# Patient Record
Sex: FEMALE | Race: WHITE | Employment: UNEMPLOYED | URBAN - METROPOLITAN AREA
[De-identification: names, ages, dates, MRNs, and addresses within clinical notes are randomized per-mention and may not be internally consistent; named-entity substitution may affect disease eponyms.]

---

## 2019-07-16 LAB — HIV, EXTERNAL: NORMAL

## 2019-12-16 LAB
CHLAMYDIA, EXTERNAL: NORMAL
HBSAG, EXTERNAL: NORMAL
N. GONORRHEA, EXTERNAL: NORMAL
RUBELLA, EXTERNAL: NORMAL
TYPE, ABO & RH, EXTERNAL: NORMAL
TYPE, ABO & RH, EXTERNAL: NORMAL

## 2020-04-30 LAB
ANTIBODY SCREEN, EXTERNAL: NORMAL
T. PALLIDUM, EXTERNAL: NON REACTIVE

## 2020-07-01 LAB — GRBS, EXTERNAL: NORMAL

## 2020-07-14 ENCOUNTER — HOSPITAL ENCOUNTER (OUTPATIENT)
Dept: PREADMISSION TESTING | Age: 35
Discharge: HOME | End: 2020-07-14
Payer: MEDICAID

## 2020-07-14 DIAGNOSIS — U07.1 COVID-19: ICD-10-CM

## 2020-07-14 PROCEDURE — 87635 SARS-COV-2 COVID-19 AMP PRB: CPT

## 2020-07-15 LAB — SARS-COV-2, COV2NT: NOT DETECTED

## 2020-07-24 ENCOUNTER — ANESTHESIA EVENT (OUTPATIENT)
Dept: LABOR AND DELIVERY | Age: 35
DRG: 560 | End: 2020-07-24
Payer: MEDICAID

## 2020-07-24 ENCOUNTER — HOSPITAL ENCOUNTER (INPATIENT)
Age: 35
LOS: 2 days | Discharge: HOME | DRG: 560 | End: 2020-07-26
Attending: OBSTETRICS & GYNECOLOGY | Admitting: OBSTETRICS & GYNECOLOGY
Payer: MEDICAID

## 2020-07-24 ENCOUNTER — ANESTHESIA (OUTPATIENT)
Dept: LABOR AND DELIVERY | Age: 35
DRG: 560 | End: 2020-07-24
Payer: MEDICAID

## 2020-07-24 DIAGNOSIS — Z36.89 NST (NON-STRESS TEST) REACTIVE: ICD-10-CM

## 2020-07-24 DIAGNOSIS — Z3A.40 40 WEEKS GESTATION OF PREGNANCY: Primary | ICD-10-CM

## 2020-07-24 DIAGNOSIS — O47.9 UTERINE CONTRACTIONS DURING PREGNANCY: ICD-10-CM

## 2020-07-24 PROBLEM — O42.90 AMNIOTIC FLUID LEAKING: Status: ACTIVE | Noted: 2020-07-24

## 2020-07-24 LAB
BASOPHILS # BLD: 0 K/UL (ref 0–0.1)
BASOPHILS NFR BLD: 0 % (ref 0–1)
DIFFERENTIAL METHOD BLD: ABNORMAL
EOSINOPHIL # BLD: 0.1 K/UL (ref 0–0.4)
EOSINOPHIL NFR BLD: 1 % (ref 0–7)
ERYTHROCYTE [DISTWIDTH] IN BLOOD BY AUTOMATED COUNT: 15.2 % (ref 11.5–14.5)
HCT VFR BLD AUTO: 38.9 % (ref 35–47)
HGB BLD-MCNC: 12.9 G/DL (ref 11.5–16)
IMM GRANULOCYTES # BLD AUTO: 0.1 K/UL (ref 0–0.04)
IMM GRANULOCYTES NFR BLD AUTO: 1 % (ref 0–0.5)
LYMPHOCYTES # BLD: 1.4 K/UL (ref 0.8–3.5)
LYMPHOCYTES NFR BLD: 14 % (ref 12–49)
MCH RBC QN AUTO: 30.2 PG (ref 26–34)
MCHC RBC AUTO-ENTMCNC: 33.2 G/DL (ref 30–36.5)
MCV RBC AUTO: 91.1 FL (ref 80–99)
MONOCYTES # BLD: 0.7 K/UL (ref 0–1)
MONOCYTES NFR BLD: 7 % (ref 5–13)
NEUTS SEG # BLD: 7.5 K/UL (ref 1.8–8)
NEUTS SEG NFR BLD: 77 % (ref 32–75)
NRBC # BLD: 0 K/UL (ref 0–0.01)
NRBC BLD-RTO: 0 PER 100 WBC
PLATELET # BLD AUTO: 303 K/UL (ref 150–400)
PMV BLD AUTO: 9.7 FL (ref 8.9–12.9)
RBC # BLD AUTO: 4.27 M/UL (ref 3.8–5.2)
WBC # BLD AUTO: 9.8 K/UL (ref 3.6–11)

## 2020-07-24 PROCEDURE — 74011250636 HC RX REV CODE- 250/636: Performed by: ADVANCED PRACTICE MIDWIFE

## 2020-07-24 PROCEDURE — 77030014125 HC TY EPDRL BBMI -B: Performed by: ANESTHESIOLOGY

## 2020-07-24 PROCEDURE — 74011000250 HC RX REV CODE- 250: Performed by: ANESTHESIOLOGY

## 2020-07-24 PROCEDURE — 36415 COLL VENOUS BLD VENIPUNCTURE: CPT

## 2020-07-24 PROCEDURE — 74011000250 HC RX REV CODE- 250

## 2020-07-24 PROCEDURE — 00HU33Z INSERTION OF INFUSION DEVICE INTO SPINAL CANAL, PERCUTANEOUS APPROACH: ICD-10-PCS | Performed by: ANESTHESIOLOGY

## 2020-07-24 PROCEDURE — A4300 CATH IMPL VASC ACCESS PORTAL: HCPCS

## 2020-07-24 PROCEDURE — 75410000000 HC DELIVERY VAGINAL/SINGLE

## 2020-07-24 PROCEDURE — 77030002933 HC SUT MCRYL J&J -A

## 2020-07-24 PROCEDURE — 0UQMXZZ REPAIR VULVA, EXTERNAL APPROACH: ICD-10-PCS | Performed by: OBSTETRICS & GYNECOLOGY

## 2020-07-24 PROCEDURE — 74011250636 HC RX REV CODE- 250/636: Performed by: OBSTETRICS & GYNECOLOGY

## 2020-07-24 PROCEDURE — 75410000003 HC RECOV DEL/VAG/CSECN EA 0.5 HR

## 2020-07-24 PROCEDURE — 75410000002 HC LABOR FEE PER 1 HR

## 2020-07-24 PROCEDURE — 77030019905 HC CATH URETH INTMIT MDII -A

## 2020-07-24 PROCEDURE — 99284 EMERGENCY DEPT VISIT MOD MDM: CPT

## 2020-07-24 PROCEDURE — 99282 EMERGENCY DEPT VISIT SF MDM: CPT

## 2020-07-24 PROCEDURE — 74011250636 HC RX REV CODE- 250/636

## 2020-07-24 PROCEDURE — 65410000002 HC RM PRIVATE OB

## 2020-07-24 PROCEDURE — 74011250637 HC RX REV CODE- 250/637: Performed by: OBSTETRICS & GYNECOLOGY

## 2020-07-24 PROCEDURE — 76060000078 HC EPIDURAL ANESTHESIA

## 2020-07-24 PROCEDURE — 74011250636 HC RX REV CODE- 250/636: Performed by: ANESTHESIOLOGY

## 2020-07-24 PROCEDURE — 85025 COMPLETE CBC W/AUTO DIFF WBC: CPT

## 2020-07-24 RX ORDER — SIMETHICONE 80 MG
80 TABLET,CHEWABLE ORAL
Status: DISCONTINUED | OUTPATIENT
Start: 2020-07-24 | End: 2020-07-26 | Stop reason: HOSPADM

## 2020-07-24 RX ORDER — NALOXONE HYDROCHLORIDE 0.4 MG/ML
0.4 INJECTION, SOLUTION INTRAMUSCULAR; INTRAVENOUS; SUBCUTANEOUS AS NEEDED
Status: DISCONTINUED | OUTPATIENT
Start: 2020-07-24 | End: 2020-07-24 | Stop reason: HOSPADM

## 2020-07-24 RX ORDER — BUPIVACAINE HYDROCHLORIDE 5 MG/ML
30 INJECTION, SOLUTION EPIDURAL; INTRACAUDAL AS NEEDED
Status: DISCONTINUED | OUTPATIENT
Start: 2020-07-24 | End: 2020-07-24 | Stop reason: HOSPADM

## 2020-07-24 RX ORDER — OXYTOCIN/0.9 % SODIUM CHLORIDE 30/500 ML
500 PLASTIC BAG, INJECTION (ML) INTRAVENOUS ONCE
Status: ACTIVE | OUTPATIENT
Start: 2020-07-24 | End: 2020-07-24

## 2020-07-24 RX ORDER — MAG HYDROX/ALUMINUM HYD/SIMETH 200-200-20
30 SUSPENSION, ORAL (FINAL DOSE FORM) ORAL
Status: DISCONTINUED | OUTPATIENT
Start: 2020-07-24 | End: 2020-07-24 | Stop reason: HOSPADM

## 2020-07-24 RX ORDER — LIDOCAINE HYDROCHLORIDE AND EPINEPHRINE 15; 5 MG/ML; UG/ML
INJECTION, SOLUTION EPIDURAL AS NEEDED
Status: DISCONTINUED | OUTPATIENT
Start: 2020-07-24 | End: 2020-07-24 | Stop reason: HOSPADM

## 2020-07-24 RX ORDER — BUPIVACAINE HYDROCHLORIDE 5 MG/ML
INJECTION, SOLUTION EPIDURAL; INTRACAUDAL
Status: DISCONTINUED
Start: 2020-07-24 | End: 2020-07-24

## 2020-07-24 RX ORDER — SODIUM CHLORIDE 0.9 % (FLUSH) 0.9 %
5-40 SYRINGE (ML) INJECTION AS NEEDED
Status: DISCONTINUED | OUTPATIENT
Start: 2020-07-24 | End: 2020-07-26 | Stop reason: HOSPADM

## 2020-07-24 RX ORDER — OXYTOCIN/0.9 % SODIUM CHLORIDE 30/500 ML
PLASTIC BAG, INJECTION (ML) INTRAVENOUS
Status: DISCONTINUED
Start: 2020-07-24 | End: 2020-07-24

## 2020-07-24 RX ORDER — OXYTOCIN/RINGER'S LACTATE 20/1000 ML
125 PLASTIC BAG, INJECTION (ML) INTRAVENOUS AS NEEDED
Status: DISCONTINUED | OUTPATIENT
Start: 2020-07-24 | End: 2020-07-26 | Stop reason: HOSPADM

## 2020-07-24 RX ORDER — SODIUM CHLORIDE 0.9 % (FLUSH) 0.9 %
5-40 SYRINGE (ML) INJECTION EVERY 8 HOURS
Status: DISCONTINUED | OUTPATIENT
Start: 2020-07-24 | End: 2020-07-25

## 2020-07-24 RX ORDER — OXYCODONE AND ACETAMINOPHEN 5; 325 MG/1; MG/1
2 TABLET ORAL
Status: DISCONTINUED | OUTPATIENT
Start: 2020-07-24 | End: 2020-07-26 | Stop reason: HOSPADM

## 2020-07-24 RX ORDER — HYDROCORTISONE 1 %
CREAM (GRAM) TOPICAL AS NEEDED
Status: DISCONTINUED | OUTPATIENT
Start: 2020-07-24 | End: 2020-07-26 | Stop reason: HOSPADM

## 2020-07-24 RX ORDER — AMMONIA 15 % (W/V)
1 AMPUL (EA) INHALATION AS NEEDED
Status: DISCONTINUED | OUTPATIENT
Start: 2020-07-24 | End: 2020-07-26 | Stop reason: HOSPADM

## 2020-07-24 RX ORDER — BUPIVACAINE HYDROCHLORIDE 2.5 MG/ML
INJECTION, SOLUTION EPIDURAL; INFILTRATION; INTRACAUDAL AS NEEDED
Status: DISCONTINUED | OUTPATIENT
Start: 2020-07-24 | End: 2020-07-24 | Stop reason: HOSPADM

## 2020-07-24 RX ORDER — DOCUSATE SODIUM 100 MG/1
100 CAPSULE, LIQUID FILLED ORAL
Status: DISCONTINUED | OUTPATIENT
Start: 2020-07-24 | End: 2020-07-26 | Stop reason: HOSPADM

## 2020-07-24 RX ORDER — ZOLPIDEM TARTRATE 5 MG/1
TABLET ORAL
COMMUNITY
End: 2020-07-26

## 2020-07-24 RX ORDER — SODIUM CHLORIDE, SODIUM LACTATE, POTASSIUM CHLORIDE, CALCIUM CHLORIDE 600; 310; 30; 20 MG/100ML; MG/100ML; MG/100ML; MG/100ML
125 INJECTION, SOLUTION INTRAVENOUS CONTINUOUS
Status: DISCONTINUED | OUTPATIENT
Start: 2020-07-24 | End: 2020-07-25

## 2020-07-24 RX ORDER — LIDOCAINE HYDROCHLORIDE AND EPINEPHRINE 15; 5 MG/ML; UG/ML
4.5 INJECTION, SOLUTION EPIDURAL AS NEEDED
Status: DISCONTINUED | OUTPATIENT
Start: 2020-07-24 | End: 2020-07-24 | Stop reason: HOSPADM

## 2020-07-24 RX ORDER — IBUPROFEN 600 MG/1
600 TABLET ORAL
Status: DISCONTINUED | OUTPATIENT
Start: 2020-07-24 | End: 2020-07-26 | Stop reason: HOSPADM

## 2020-07-24 RX ORDER — ONDANSETRON 2 MG/ML
4 INJECTION INTRAMUSCULAR; INTRAVENOUS
Status: DISCONTINUED | OUTPATIENT
Start: 2020-07-24 | End: 2020-07-24

## 2020-07-24 RX ORDER — FENTANYL/BUPIVACAINE/NS/PF 2-1250MCG
PREFILLED PUMP RESERVOIR EPIDURAL
Status: COMPLETED
Start: 2020-07-24 | End: 2020-07-24

## 2020-07-24 RX ORDER — DIPHENHYDRAMINE HCL 25 MG
25 CAPSULE ORAL
Status: DISCONTINUED | OUTPATIENT
Start: 2020-07-24 | End: 2020-07-26 | Stop reason: HOSPADM

## 2020-07-24 RX ORDER — FENTANYL CITRATE 50 UG/ML
INJECTION, SOLUTION INTRAMUSCULAR; INTRAVENOUS
Status: COMPLETED
Start: 2020-07-24 | End: 2020-07-24

## 2020-07-24 RX ORDER — BUPROPION HYDROCHLORIDE 300 MG/1
300 TABLET ORAL
Status: DISCONTINUED | OUTPATIENT
Start: 2020-07-25 | End: 2020-07-24 | Stop reason: CLARIF

## 2020-07-24 RX ORDER — OXYTOCIN/RINGER'S LACTATE 20/1000 ML
999 PLASTIC BAG, INJECTION (ML) INTRAVENOUS AS NEEDED
Status: DISCONTINUED | OUTPATIENT
Start: 2020-07-24 | End: 2020-07-26 | Stop reason: HOSPADM

## 2020-07-24 RX ORDER — EPHEDRINE SULFATE/0.9% NACL/PF 50 MG/5 ML
SYRINGE (ML) INTRAVENOUS
Status: DISCONTINUED
Start: 2020-07-24 | End: 2020-07-24 | Stop reason: WASHOUT

## 2020-07-24 RX ORDER — OXYCODONE AND ACETAMINOPHEN 5; 325 MG/1; MG/1
1 TABLET ORAL
Status: DISCONTINUED | OUTPATIENT
Start: 2020-07-24 | End: 2020-07-26 | Stop reason: HOSPADM

## 2020-07-24 RX ORDER — OXYTOCIN/0.9 % SODIUM CHLORIDE 30/500 ML
0-25 PLASTIC BAG, INJECTION (ML) INTRAVENOUS
Status: DISCONTINUED | OUTPATIENT
Start: 2020-07-24 | End: 2020-07-24

## 2020-07-24 RX ORDER — FENTANYL CITRATE 50 UG/ML
100 INJECTION, SOLUTION INTRAMUSCULAR; INTRAVENOUS ONCE
Status: COMPLETED | OUTPATIENT
Start: 2020-07-24 | End: 2020-07-24

## 2020-07-24 RX ORDER — BUPROPION HYDROCHLORIDE 300 MG/1
300 TABLET ORAL
COMMUNITY

## 2020-07-24 RX ORDER — LIDOCAINE HYDROCHLORIDE 10 MG/ML
10 INJECTION INFILTRATION; PERINEURAL AS NEEDED
Status: DISCONTINUED | OUTPATIENT
Start: 2020-07-24 | End: 2020-07-24 | Stop reason: HOSPADM

## 2020-07-24 RX ORDER — BUTORPHANOL TARTRATE 1 MG/ML
1 INJECTION INTRAMUSCULAR; INTRAVENOUS
Status: DISCONTINUED | OUTPATIENT
Start: 2020-07-24 | End: 2020-07-26 | Stop reason: HOSPADM

## 2020-07-24 RX ORDER — FENTANYL/BUPIVACAINE/NS/PF 2-1250MCG
1-16 PREFILLED PUMP RESERVOIR EPIDURAL CONTINUOUS
Status: DISCONTINUED | OUTPATIENT
Start: 2020-07-24 | End: 2020-07-24

## 2020-07-24 RX ORDER — CALCIUM CARBONATE 200(500)MG
200 TABLET,CHEWABLE ORAL
Status: DISCONTINUED | OUTPATIENT
Start: 2020-07-24 | End: 2020-07-24

## 2020-07-24 RX ORDER — BUPROPION HYDROCHLORIDE 150 MG/1
150 TABLET, EXTENDED RELEASE ORAL 2 TIMES DAILY
Status: DISCONTINUED | OUTPATIENT
Start: 2020-07-25 | End: 2020-07-25

## 2020-07-24 RX ORDER — ONDANSETRON 4 MG/1
4 TABLET, ORALLY DISINTEGRATING ORAL
Status: DISCONTINUED | OUTPATIENT
Start: 2020-07-24 | End: 2020-07-26 | Stop reason: HOSPADM

## 2020-07-24 RX ADMIN — IBUPROFEN 600 MG: 600 TABLET, FILM COATED ORAL at 17:27

## 2020-07-24 RX ADMIN — LIDOCAINE HYDROCHLORIDE,EPINEPHRINE BITARTRATE 3 ML: 15; .005 INJECTION, SOLUTION EPIDURAL; INFILTRATION; INTRACAUDAL; PERINEURAL at 05:50

## 2020-07-24 RX ADMIN — Medication 10 ML: at 03:04

## 2020-07-24 RX ADMIN — OXYTOCIN 2 MILLI-UNITS/MIN: 10 INJECTION, SOLUTION INTRAMUSCULAR; INTRAVENOUS at 11:23

## 2020-07-24 RX ADMIN — BUPIVACAINE HYDROCHLORIDE 10 ML: 2.5 INJECTION, SOLUTION EPIDURAL; INFILTRATION; INTRACAUDAL; PERINEURAL at 05:53

## 2020-07-24 RX ADMIN — Medication 10 ML/HR: at 06:03

## 2020-07-24 RX ADMIN — SODIUM CHLORIDE, SODIUM LACTATE, POTASSIUM CHLORIDE, AND CALCIUM CHLORIDE 125 ML/HR: 600; 310; 30; 20 INJECTION, SOLUTION INTRAVENOUS at 13:05

## 2020-07-24 RX ADMIN — SODIUM CHLORIDE, SODIUM LACTATE, POTASSIUM CHLORIDE, AND CALCIUM CHLORIDE 999 ML/HR: 600; 310; 30; 20 INJECTION, SOLUTION INTRAVENOUS at 03:34

## 2020-07-24 RX ADMIN — SODIUM CHLORIDE, SODIUM LACTATE, POTASSIUM CHLORIDE, AND CALCIUM CHLORIDE 125 ML/HR: 600; 310; 30; 20 INJECTION, SOLUTION INTRAVENOUS at 07:37

## 2020-07-24 RX ADMIN — ONDANSETRON 4 MG: 2 INJECTION INTRAMUSCULAR; INTRAVENOUS at 11:31

## 2020-07-24 RX ADMIN — ONDANSETRON 4 MG: 2 INJECTION INTRAMUSCULAR; INTRAVENOUS at 14:32

## 2020-07-24 RX ADMIN — SODIUM CHLORIDE, SODIUM LACTATE, POTASSIUM CHLORIDE, AND CALCIUM CHLORIDE 999 ML/HR: 600; 310; 30; 20 INJECTION, SOLUTION INTRAVENOUS at 12:16

## 2020-07-24 RX ADMIN — Medication 10 ML/HR: at 13:12

## 2020-07-24 RX ADMIN — BUTORPHANOL TARTRATE 1 MG: 1 INJECTION, SOLUTION INTRAMUSCULAR; INTRAVENOUS at 02:59

## 2020-07-24 RX ADMIN — SODIUM CHLORIDE, SODIUM LACTATE, POTASSIUM CHLORIDE, AND CALCIUM CHLORIDE 999 ML/HR: 600; 310; 30; 20 INJECTION, SOLUTION INTRAVENOUS at 05:36

## 2020-07-24 RX ADMIN — FENTANYL CITRATE 100 MCG: 50 INJECTION, SOLUTION INTRAMUSCULAR; INTRAVENOUS at 05:53

## 2020-07-24 RX ADMIN — PROMETHAZINE HYDROCHLORIDE 12.5 MG: 25 INJECTION INTRAMUSCULAR; INTRAVENOUS at 03:16

## 2020-07-24 RX ADMIN — IBUPROFEN 600 MG: 600 TABLET, FILM COATED ORAL at 23:17

## 2020-07-24 NOTE — PROGRESS NOTES
0025~  Patient arrived from home after onset of contractions late last evening. Was seen in the office on 7/23 and was 1/50, membranes were swept by Dr Rachel Lopez.  Anne-Marie Kruse  Call placed to [address]  0035~  SVE  0036~  [address] in room to see patient  0100~  Patient up to ambulate in halls  0135~  Patient returned from walk, felt a gush of fluid. 0140~  Nitrazine positive. Admit to labor and delivery. 0150~  TRANSFER - OUT REPORT:    Verbal report given to KLEBER Hampton RN(name) on [employer]  being transferred to L&D(unit) for routine progression of care       Report consisted of patients Situation, Background, Assessment and   Recommendations(SBAR). Information from the following report(s) SBAR and Recent Results was reviewed with the receiving nurse. Lines:   Peripheral IV 07/24/20 Left;Posterior Forearm (Active)        Opportunity for questions and clarification was provided.       Patient transported with:   Registered Nurse

## 2020-07-24 NOTE — ANESTHESIA PREPROCEDURE EVALUATION
Relevant Problems   No relevant active problems       Anesthetic History   No history of anesthetic complications            Review of Systems / Medical History  Patient summary reviewed, nursing notes reviewed and pertinent labs reviewed    Pulmonary  Within defined limits                 Neuro/Psych   Within defined limits           Cardiovascular  Within defined limits                Exercise tolerance: >4 METS     GI/Hepatic/Renal  Within defined limits              Endo/Other  Within defined limits           Other Findings              Physical Exam    Airway  Mallampati: II  TM Distance: > 6 cm  Neck ROM: normal range of motion   Mouth opening: Normal     Cardiovascular  Regular rate and rhythm,  S1 and S2 normal,  no murmur, click, rub, or gallop             Dental  No notable dental hx       Pulmonary  Breath sounds clear to auscultation               Abdominal  GI exam deferred       Other Findings            Anesthetic Plan    ASA: 2  Anesthesia type: epidural          Induction: Intravenous  Anesthetic plan and risks discussed with: Patient and Spouse

## 2020-07-24 NOTE — L&D DELIVERY NOTE
Delivery Summary    Patient: Nir Butler MRN: [id number]  SSN: [id number]    YOB: 1985  Age: 37 y.o. Sex: female       Information for the patient's :  Kale Alvarez [[id number]]     Patient pushed for just over two hours with spontaneous delivery of head, followed by delivery of fetal body.  had great tone, was placed directly on maternal abdomen, mouth and nose were suctioned, then had a strong cry. Delayed cord clamping was performed, then cut by the father of the baby. The placenta delivered intact. No perineal lacerations were noted. A superficial left labial laceration was identified and repaired using 4-0 monocryl in a running fashion. The fundus was firm and bleeding was appropriate. Labor Events:    Labor: No    Steroids: None   Cervical Ripening Date/Time:       Cervical Ripening Type: None   Antibiotics During Labor: No   Rupture Identifier:      Rupture Date/Time: 2020 1:40 AM   Rupture Type: SROM   Amniotic Fluid Volume: Moderate    Amniotic Fluid Description:      Amniotic Fluid Odor:      Induction: None       Induction Date/Time:        Indications for Induction:      Augmentation:     Augmentation Date/Time:      Indications for Augmentation:     Labor complications: Additional complications:        Delivery Events:  Indications For Episiotomy:     Episiotomy: None   Perineal Laceration(s): None   Repaired:     Periurethral Laceration Location:      Repaired:     Labial Laceration Location: left   Repaired:     Sulcal Laceration Location:     Repaired:     Vaginal Laceration Location:     Repaired:     Cervical Laceration Location:     Repaired:     Repair Suture:  Other (See Note)   Number of Repair Packets: 1   Estimated Blood Loss (ml):  ml   Quantitative Blood Loss (ml)                Delivery Date: 2020    Delivery Time: 3:09 PM  Delivery Type: Vaginal, Spontaneous  Sex:  Female    Gestational Age: 44w3d   Delivery Clinician:  Pattie Ramirez  Living Status: Living   Delivery Location: L&D LD room 6          APGARS  One minute Five minutes Ten minutes   Skin color: 1   1        Heart rate: 2   2        Grimace: 2   2        Muscle tone: 2   2        Breathin   2        Totals: 9   9            Presentation: Vertex    Position:   Occiput Anterior  Resuscitation Method:  Suctioning-bulb; Tactile Stimulation     Meconium Stained: Thick      Cord Information: 3 Vessels  Complications: None  Cord around:    Delayed cord clamping? Yes  Cord clamped date/time:2020  3:11 PM  Disposition of Cord Blood: Discard    Blood Gases Sent?: No    Placenta:  Date/Time: 2020  3:14 PM  Removal: Spontaneous      Appearance: Normal      Measurements:  Birth Weight:        Birth Length:        Head Circumference:        Chest Circumference:       Abdominal Girth: Other Providers:   Oliva Ortiz, Obstetrician;Primary Nurse;Primary  Nurse           Group B Strep:   Lab Results   Component Value Date/Time    GrBStrep, External neg 2020     Information for the patient's :  Nick Sorto [[id number]]   No results found for: ABORH, PCTABR, PCTDIG, BILI, ABORHEXT, ABORH     No results for input(s): PCO2CB, PO2CB, HCO3I, SO2I, IBD, PTEMPI, SPECTI, PHICB, ISITE, IDEV, IALLEN in the last 72 hours.

## 2020-07-24 NOTE — PROGRESS NOTES
Labor Progress Note    S: Comfortable now with epidural.    Physical Exam:  Patient Vitals for the past 4 hrs:   Temp Pulse Resp BP SpO2   20 0700     99 %   20 0659  96  109/60    20 0638  (!) 105  94/53 99 %   20 0627  (!) 106  97/52 99 %   20 0622  (!) 110  93/45 99 %   20 0609  (!) 103  112/56    20 0608     99 %   20 0607     99 %   20 0604  (!) 103  110/49    20 0557  (!) 123  110/61 99 %   20 0554     94 %   20 0553 97.8 °F (36.6 °C) (!) 105 18 123/79    20 0406 98.1 °F (36.7 °C)             Cervical Exam: 7-8/90/-1 per Gonsalo Hope RN  Membranes:  Light mec  Uterine Contractions:  Frequency: Irregular q1.5-5 minutes, strong  Fetal Heart Rate: Reactive, 135s, +accels, early decels, moderate variability      Assessment/Plan:    37 y.o.  at 40w1d IUP  Active labor  Cat 1 tracing  GBS negative    P:  Continue present management    Signout to Dr. Gena Chavez at [address] [place] [address]

## 2020-07-24 NOTE — PROGRESS NOTES
TRANSFER - IN REPORT:    Verbal report received from EDWIN Perez RN(name) on Frankie Chung  being received from L&D(unit) for routine progression of care      Report consisted of patients Situation, Background, Assessment and   Recommendations(SBAR). Information from the following report(s) SBAR was reviewed with the receiving nurse. Opportunity for questions and clarification was provided. Assessment completed upon patients arrival to unit and care assumed. 6691:  Meconium stained fluid and bloody show noted on pad  0542:  Dr Richard Botello at bedside for epidural placement   8227: Bedside and Verbal shift change report given to [address] [place] [address] (oncoming nurse) by Arabella Robert RN (offgoing nurse). Report included the following information SBAR, Kardex, Intake/Output, MAR and Recent Results.

## 2020-07-24 NOTE — ED PROVIDER NOTES
[facility] History and Physical    Patient is a 37 y.o.  at 40w1d with yakov 20 who presents to the [facility] with c/o contractions at 0033. She reports contractions started around 2200 and are now about q10 minutes or less. She was checked in the office today and had her membranes stripped and has had some spotting since. She reports good FM. Denies LOF, n/v/d, fever, cough, sore throat, SOB/difficulty breathing, loss of taste/smell, exposure to anyone with COVID, h/a, changes in vision, RUQ/epigastric pain. She reports prenatal care with Dr. Scooter Rosas c/b [facility], [facility]- had growth scan at 28 weeks and NIPT screening normal. H/o anxiety/depression (recovering alcoholic)- on wellbutrin. PNC: Blood type: A            RH: pos            Hep B: negative            Rubella: immune            GBS status: negative            HIV: non reactive            RPR/TPA/VDRL: non reactive            GC/CT: negative            HSV serology: not noted on prenatal records, but patient denies any hx of HSV    The history is provided by the patient. Contractions    This is a new problem. The current episode started 3 to 5 hours ago. Episode frequency: intermittent q10 minutes. The problem has been gradually worsening. The pain is at a severity of 8/10. Pertinent negatives include no fever, no diarrhea, no nausea, no vomiting, no headaches and no chest pain.         Past Medical History:   Diagnosis Date    Abnormal Papanicolaou smear of cervix     normal f/u    Breast disorder     augmentation    Psychiatric problem     anxiety    Psychiatric problem 2018    recovering alcoholic       Past Surgical History:   Procedure Laterality Date    BREAST SURGERY PROCEDURE UNLISTED      augmentation    HX OTHER SURGICAL      wisdom teeth         Family History:   Problem Relation Age of Onset    Diabetes Maternal Grandmother     Colon Cancer Maternal Grandfather        Social History     Socioeconomic History    Marital status: SINGLE     Spouse name: Not on file    Number of children: Not on file    Years of education: Not on file    Highest education level: Not on file   Occupational History    Not on file   Social Needs    Financial resource strain: Not on file    Food insecurity     Worry: Not on file     Inability: Not on file    Transportation needs     Medical: Not on file     Non-medical: Not on file   Tobacco Use    Smoking status: Never Smoker    Smokeless tobacco: Never Used   Substance and Sexual Activity    Alcohol use: Not Currently    Drug use: Never    Sexual activity: Not on file   Lifestyle    Physical activity     Days per week: Not on file     Minutes per session: Not on file    Stress: Not on file   Relationships    Social connections     Talks on phone: Not on file     Gets together: Not on file     Attends Nondenominational service: Not on file     Active member of club or organization: Not on file     Attends meetings of clubs or organizations: Not on file     Relationship status: Not on file    Intimate partner violence     Fear of current or ex partner: Not on file     Emotionally abused: Not on file     Physically abused: Not on file     Forced sexual activity: Not on file   Other Topics Concern     Service Not Asked    Blood Transfusions Not Asked    Caffeine Concern Not Asked    Occupational Exposure Not Asked   Melina Fent Hazards Not Asked    Sleep Concern Not Asked    Stress Concern Not Asked    Weight Concern Not Asked    Special Diet Not Asked    Back Care Not Asked    Exercise Not Asked    Bike Helmet Not Asked    Seat Belt Not Asked    Self-Exams Not Asked   Social History Narrative    Not on file         ALLERGIES: Patient has no known allergies. Review of Systems   Constitutional: Negative for chills and fever. Eyes: Negative for visual disturbance. Respiratory: Negative for cough and shortness of breath. Cardiovascular: Negative for chest pain and leg swelling. Gastrointestinal: Positive for abdominal pain. Negative for diarrhea, nausea and vomiting. Contractions   Genitourinary: Negative for vaginal bleeding and vaginal discharge. Musculoskeletal: Negative for gait problem. Neurological: Negative for speech difficulty and headaches. All other systems reviewed and are negative. Vitals:    07/24/20 0029 07/24/20 0051 07/24/20 0121   BP: 141/86 128/84    Pulse: 96 92    Resp: 14 16    Temp: 97.8 °F (36.6 °C) 97.7 °F (36.5 °C)    Weight:   78 kg (172 lb)   Height:   5' 5\" (1.651 m)            Physical Exam  Vitals signs and nursing note reviewed. Exam conducted with a chaperone present. Constitutional:       General: She is awake. Appearance: Normal appearance. She is well-developed, well-groomed and normal weight. HENT:      Head: Normocephalic. Nose: Nose normal.   Neck:      Musculoskeletal: Normal range of motion. Cardiovascular:      Rate and Rhythm: Normal rate and regular rhythm. Pulses: Normal pulses. Pulmonary:      Effort: Pulmonary effort is normal.      Comments: Breathing easy and unlabored between contractions  Abdominal:      Tenderness: There is no abdominal tenderness. Comments: Gravid- approximately 40 weeks  FHTs: 135s, +accels, neg decels, moderate variability  [place]: q6-7 minutes, mild to moderate   Genitourinary:     General: Normal vulva. Exam position: Supine. Labia:         Right: No lesion. Left: No lesion. Vagina: Normal.      Cervix: Dilated. Uterus: Normal. Enlarged. Rectum: Normal.      Comments: Gravid uterus  SVE: 2/90/-2, vtx per Elsa Singleton RN  Membranes: intact  Musculoskeletal: Normal range of motion. Right lower leg: No edema. Left lower leg: No edema. Skin:     General: Skin is warm and dry. Neurological:      Mental Status: She is alert and oriented to person, place, and time. Gait: Gait is intact.    Psychiatric:         Mood and Affect: Mood normal.         Speech: Speech normal.         Behavior: Behavior normal. Behavior is cooperative. Thought Content: Thought content normal.         Cognition and Memory: Cognition and memory normal.         Judgment: Judgment normal.     Exam:  General: alert & oriented x3  HEENT:  normal   Lungs:  clear  Cor:  RRR  Abdomen:  Soft, non-tender                    Clinical EFW 7lbs 8oz  Cervix:  2/90/-2, vtx per RN  Fluid:  intact  Pelvimetry:  AP-good                      Arch- adequate                      Sidewalls- adequate                      Pelvis feels adequate for fetus. Extremities:  normal, no edema        MDM  Number of Diagnoses or Management Options  40 weeks gestation of pregnancy: established and improving  NST (non-stress test) reactive: new and requires workup  Uterine contractions during pregnancy: new and requires workup     Amount and/or Complexity of Data Reviewed  Clinical lab tests: reviewed  Tests in the medicine section of CPT®: ordered and reviewed  Review and summarize past medical records: yes  Independent visualization of images, tracings, or specimens: yes    Risk of Complications, Morbidity, and/or Mortality  Presenting problems: moderate  Diagnostic procedures: moderate  Management options: moderate    Critical Care  Total time providing critical care: < 30 minutes    Patient Progress  Patient progress: stable    ED Course as of  0238      0045 Patient arrives to [facility] with c/o contractions that started around 2200 and are about q10 minutes or less. Good FM. Some spotting since having her membranes stripped this am. Denies LOF. SVE 2/90/-2 per RN. Will recheck 1-2 hours.      [SB]      ED Course User Index  [SB] Kate FAY CNM       Procedures  NST, SVE    Impression:  at 40w1d IUP  Latent labor vs prodromal labor vs false labor  Cat 1 tracing  GBS negative      Plan:  Admit to [facility] for NST, labor check  Reviewed SVE with patient/partner and they desire to ambulate and be rechecked in an hour or two  RN to recheck at that time  All questions asked/answered and patient/partner agree with plan    Update: 0220  Patient SROM while walking.  Will admit to L&D

## 2020-07-24 NOTE — PROGRESS NOTES
Labor Progress Note  Patient seen, fetal heart rate and contraction pattern evaluated, patient examined. Patient Vitals for the past 1 hrs:   BP Temp Pulse Resp   20 0801 96/82 99 °F (37.2 °C) 96 16   Subjective:  Patient is without complaint. Comfortable with her epidural and is not feeling pressure. Physical Exam:  Visit Vitals  BP 96/82   Pulse 96   Temp 99 °F (37.2 °C)   Resp 16   Ht 5' 5\" (1.651 m)   Wt 78 kg (172 lb)   SpO2 99%   Breastfeeding No   BMI 28.62 kg/m²     GEN: NAD, resting on her side  Pulm: no resp distress  Abd: soft, gravid  : normal appearance, cervix 9cm/100%/-1    Fetal Heart Rate: Cat 1  Tocometry: contractions every 3-5 minutes    Assessment/Plan:  33yo  @ 40w1d in term labor. 1. Labor: progressing well. Patient has continued to make cervical change, so will hold on pitocin for now - but if her cervix is unchanged or her contractions space more, recommend we consider pitocin augmentation. 2. IUP: reassuring fetal surveillance  3. H/o anxiety/depression and recovering alcoholic, pt on wellbutrin  4. AMA, normal NIPT  APA, normal 28 week growth scan  5. COVID neg  6. GBS neg  7.  Anticipate vaginal delivery

## 2020-07-24 NOTE — PROGRESS NOTES
1138 - Bedside and Verbal shift change report given to H. Leopold Cheek RNC (oncoming nurse) by Lilly Ivory RN (offgoing nurse). Report included the following information SBAR, Kardex, Procedure Summary, Intake/Output, MAR, Accordion, Recent Results and Med Rec Status. Pt is resting on her side and breathing through the pressure/discomfort of UCs. \"Laboring down'/awaiting Pitocin augmentation to get contractions closer before resuming pushing. 1215 - MEWS score of 3 at 1208. Temp stable at 99.0. LR bolus of 500mL given for maternal tachycardia. Will continue to monitor. 1312 - MEWS score remains 3. Pt temp 99.9    1319 - Dr Freya Arzate at bedside to assess pt. Resumed pushing at 1318.    1336 - Dr Freya Arzate out. She is aware of slightly elevated temp. 1509 -  of live female infant. 1519 - MEWS score of 3 due to tachycardia; will continue to assess. 1734 - MEWS score now 1    1615 - TRANSFER - OUT REPORT:    Verbal report given to [facility] RN(name) on [employer]  being transferred to [facility] Rm 330(unit) for routine progression of care       Report consisted of patients Situation, Background, Assessment and   Recommendations(SBAR). Information from the following report(s) SBAR, Kardex, Procedure Summary, Intake/Output, MAR, Accordion, Recent Results and Med Rec Status was reviewed with the receiving nurse. Lines:   Peripheral IV 20 Left;Posterior Forearm (Active)   Site Assessment Clean, dry, & intact 20 0800   Phlebitis Assessment 0 20 0800   Infiltration Assessment 0 20 0800   Dressing Status Clean, dry, & intact 20 08   Dressing Type Transparent;Tape 20 08   Hub Color/Line Status Pink 20 08        Opportunity for questions and clarification was provided. Patient transported with:   Registered Nurse via wheelchair, accompanied by  pushing NB in open crib.

## 2020-07-24 NOTE — PROGRESS NOTES
In to see patient  She's doing well, starting to feel pressure  Cervix 10cm/100%/0  Cat 2  Patient had started pushing, contractions about every 10 minutes  Will start pitocin augmentation and labor down for about 30 minutes in attempt to avoid maternal fatigue, then resume pushing  Will administer 4mg IV zofran for patient's complaints of nausea

## 2020-07-24 NOTE — H&P
Labor and Delivery History and Physical  2020    Patient is a 37 y.o. Tay Guajardo at 40w1d with yakov 20 who is now admitted to L&D for latent labor and SROM. She initially presented to the [facility] with c/o contractions at 0033. She reports contractions started around 2200 and are now about q10 minutes or less. She was checked in the office today and had her membranes stripped and has had some spotting since. She reports good FM. Denies LOF, n/v/d, fever, cough, sore throat, SOB/difficulty breathing, loss of taste/smell, exposure to anyone with COVID, h/a, changes in vision, RUQ/epigastric pain.       She reports prenatal care with Dr. Cheyanne Smith c/b [facility], [facility]- had growth scan at 28 weeks and NIPT screening normal. H/o anxiety/depression (recovering alcoholic)- on wellbutrin.          PNC: Blood type: A            RH: pos            Hep B: negative            Rubella: immune            GBS status: negative            HIV: non reactive            RPR/TPA/VDRL: non reactive            GC/CT: negative            HSV serology: not noted on prenatal records, but patient denies any hx of HSV     The history is provided by the patient. Contractions    This is a new problem. The current episode started 3 to 5 hours ago. Episode frequency: intermittent q10 minutes. The problem has been gradually worsening. The pain is at a severity of 8/10. Pertinent negatives include no fever, no diarrhea, no nausea, no vomiting, no headaches and no chest pain.      OBHX:   OB History        2    Para        Term                AB   1    Living           SAB        TAB        Ectopic        Molar        Multiple        Live Births                    PMH:   Past Medical History:   Diagnosis Date    Abnormal Papanicolaou smear of cervix     normal f/u    Breast disorder     augmentation    Psychiatric problem     anxiety    Psychiatric problem 2018    recovering alcoholic       PSH:   Past Surgical History:   Procedure Laterality Date    BREAST SURGERY PROCEDURE UNLISTED      augmentation    HX OTHER SURGICAL      wisdom teeth       OB/GYN:  40w1d with yakov 20. See above    Meds:   Prior to Admission Medications   Prescriptions Last Dose Informant Patient Reported? Taking? PNV No12-Iron-FA-DSS-OM-3 29 mg iron-1 mg -50 mg CPKD 2020 at Unknown time  Yes Yes   Sig: Take  by mouth. buPROPion XL (WELLBUTRIN XL) 300 mg XL tablet 2020 at Unknown time  Yes Yes   Sig: Take 300 mg by mouth every morning. zolpidem (AMBIEN) 5 mg tablet 2020 at Unknown time  Yes Yes   Sig: Take  by mouth nightly as needed for Sleep.       Facility-Administered Medications: None       Allergies: No Known Allergies    Pertinent ROS: Review of Systems - Negative except noted in [facility]:   Family History   Problem Relation Age of Onset    Diabetes Maternal Grandmother     Colon Cancer Maternal Grandfather        SH:   Social History     Socioeconomic History    Marital status: SINGLE     Spouse name: Not on file    Number of children: Not on file    Years of education: Not on file    Highest education level: Not on file   Occupational History    Not on file   Social Needs    Financial resource strain: Not on file    Food insecurity     Worry: Not on file     Inability: Not on file    Transportation needs     Medical: Not on file     Non-medical: Not on file   Tobacco Use    Smoking status: Never Smoker    Smokeless tobacco: Never Used   Substance and Sexual Activity    Alcohol use: Not Currently    Drug use: Never    Sexual activity: Not on file   Lifestyle    Physical activity     Days per week: Not on file     Minutes per session: Not on file    Stress: Not on file   Relationships    Social connections     Talks on phone: Not on file     Gets together: Not on file     Attends [employer] service: Not on file     Active member of club or organization: Not on file     Attends meetings of clubs or organizations: Not on file     Relationship status: Not on file    Intimate partner violence     Fear of current or ex partner: Not on file     Emotionally abused: Not on file     Physically abused: Not on file     Forced sexual activity: Not on file   Other Topics Concern     Service Not Asked    Blood Transfusions Not Asked    Caffeine Concern Not Asked    Occupational Exposure Not Asked    Hobby Hazards Not Asked    Sleep Concern Not Asked    Stress Concern Not Asked    Weight Concern Not Asked    Special Diet Not Asked    Back Care Not Asked    Exercise Not Asked    Bike Helmet Not Asked    [address] Not Asked    Self-Exams Not Asked   Social History Narrative    Not on file       OBJECTIVE:    Temp (24hrs), Av.8 °F (36.6 °C), Min:97.7 °F (36.5 °C), Max:97.8 °F (36.6 °C)    Visit Vitals  /84   Pulse 92   Temp 97.7 °F (36.5 °C)   Resp 16   Ht 5' 5\" (1.651 m)   Wt 78 kg (172 lb)   Breastfeeding No   BMI 28.62 kg/m²       FHR baseline 140s, +accels, early/variable decel, moderate variability  [place] q3-6.5 minutes, mild to moderate    Exam:  General: alert & oriented x3  HEENT:  normal   Lungs:  clear  Cor:  RRR  Abdomen:  Soft, non-tender                    Clinical EFW 7lbs 8oz  Cervix:  2/90/-2 per last check by Santa Sparks RN  Fluid:  SROM, clear  Pelvimetry:  AP-good                      Arch- adequate                      Sidewalls- adequate                      Pelvis feels adequate for fetus.   Extremities:  normal, no edema       Impression:  at 40w1d IUP  Latent labor  SROM  Cat 2 tracing  GBS negative      Plan:  Admit  Review and sign consents  CBC, T&S  Continuous monitoring  IV pain med management if desires  Epidural prn  Reviewed prenatal records    Reviewed plan with patient/partner, all questions asked/answered and they agree with plan    Debbie Lisa CNM  2:30 AM

## 2020-07-25 PROCEDURE — 65410000002 HC RM PRIVATE OB

## 2020-07-25 PROCEDURE — 74011250637 HC RX REV CODE- 250/637: Performed by: OBSTETRICS & GYNECOLOGY

## 2020-07-25 RX ORDER — BUPROPION HYDROCHLORIDE 150 MG/1
150 TABLET, EXTENDED RELEASE ORAL EVERY 12 HOURS
Status: DISCONTINUED | OUTPATIENT
Start: 2020-07-25 | End: 2020-07-26 | Stop reason: HOSPADM

## 2020-07-25 RX ADMIN — IBUPROFEN 600 MG: 600 TABLET, FILM COATED ORAL at 17:22

## 2020-07-25 RX ADMIN — BUPROPION HYDROCHLORIDE 150 MG: 150 TABLET, EXTENDED RELEASE ORAL at 20:54

## 2020-07-25 RX ADMIN — DOCUSATE SODIUM 100 MG: 100 CAPSULE, LIQUID FILLED ORAL at 10:18

## 2020-07-25 RX ADMIN — IBUPROFEN 600 MG: 600 TABLET, FILM COATED ORAL at 10:19

## 2020-07-25 RX ADMIN — BUPROPION HYDROCHLORIDE 150 MG: 150 TABLET, EXTENDED RELEASE ORAL at 10:18

## 2020-07-25 RX ADMIN — IBUPROFEN 600 MG: 600 TABLET, FILM COATED ORAL at 04:47

## 2020-07-25 NOTE — ROUTINE PROCESS
2000- Bedside shift change report given to JOE Garcia RN (oncoming nurse) by JONATHON Mejia RN (offgoing nurse). Report included the following information SBAR.

## 2020-07-25 NOTE — LACTATION NOTE
This note was copied from a baby's chart. Initial Lactation Consultation: Infant born vaginally this afternoon to a  mom at 36 1/7 weeks gestation. Mom noted breast changes during the pregnancy and has easily expressed colostrum. She had breast augmentation surgery (placed under the muscle) many years ago. The left implant burst and reabsorbed. The infant was seen at breast, deep latch noted with rhythmic sucking and swallowing noted. Assisted mom with positioning in the cross cradle and football positions, using pillows for support. Feeding Plan: Mother will keep baby skin to skin as often as possible, feed on demand, 8-12x/day , respond to feeding cues, obtain latch, listen for audible swallowing, be aware of signs of oxytocin release/ cramping,thirst,sleepiness while breastfeeding, offer both breasts,and will not limit feedings. Mother agrees to utilize breast massage while nursing to facilitate lactogenesis.

## 2020-07-25 NOTE — PROGRESS NOTES
Post-Partum Day Number 1 Progress Note    Frankie Chung     Assessment: Doing well, post partum day 1    Plan:  1. Continue routine postpartum and perineal care as well as maternal education. 2. Female , doing well  3. H/o anxiety/ depression, stable on Wellbutrin    Information for the patient's :  [place] [[id number]]   Vaginal, Spontaneous      Subjective:  Patient doing well without significant complaint. Voiding without difficulty, normal lochia. Vitals:  Visit Vitals  BP 97/44 (BP 1 Location: Right arm, BP Patient Position: At rest)   Pulse 93   Temp 97.8 °F (36.6 °C)   Resp 16   Ht 5' 5\" (1.651 m)   Wt 78 kg (172 lb)   SpO2 98%   Breastfeeding Unknown   BMI 28.62 kg/m²     Temp (24hrs), Av.8 °F (37.1 °C), Min:97.8 °F (36.6 °C), Max:99.9 °F (37.7 °C)        Exam:   Patient without distress. Abdomen soft, fundus firm, nontender                Perineum with normal lochia noted. Lower extremities are negative for swelling, cords or tenderness. Labs:     Lab Results   Component Value Date/Time    WBC 9.8 2020 03:05 AM    HGB 12.9 2020 03:05 AM    HCT 38.9 2020 03:05 AM    PLATELET 797  03:05 AM       No results found for this or any previous visit (from the past 24 hour(s)).

## 2020-07-26 VITALS
RESPIRATION RATE: 16 BRPM | HEART RATE: 82 BPM | TEMPERATURE: 97.9 F | OXYGEN SATURATION: 98 % | HEIGHT: 65 IN | WEIGHT: 172 LBS | SYSTOLIC BLOOD PRESSURE: 111 MMHG | BODY MASS INDEX: 28.66 KG/M2 | DIASTOLIC BLOOD PRESSURE: 73 MMHG

## 2020-07-26 PROCEDURE — 74011250637 HC RX REV CODE- 250/637: Performed by: OBSTETRICS & GYNECOLOGY

## 2020-07-26 RX ORDER — IBUPROFEN 600 MG/1
600 TABLET ORAL
Qty: 40 TAB | Refills: 0 | Status: SHIPPED | OUTPATIENT
Start: 2020-07-26

## 2020-07-26 RX ADMIN — BUPROPION HYDROCHLORIDE 150 MG: 150 TABLET, EXTENDED RELEASE ORAL at 09:00

## 2020-07-26 RX ADMIN — DOCUSATE SODIUM 100 MG: 100 CAPSULE, LIQUID FILLED ORAL at 09:00

## 2020-07-26 RX ADMIN — IBUPROFEN 600 MG: 600 TABLET, FILM COATED ORAL at 09:00

## 2020-07-26 RX ADMIN — IBUPROFEN 600 MG: 600 TABLET, FILM COATED ORAL at 02:49

## 2020-07-26 NOTE — DISCHARGE SUMMARY
Obstetrical Discharge Summary     Name: Teresa Rocha MRN: [id number]  SSN: [id number]    YOB: 1985  Age: 37 y.o. Sex: female      Admit Date: 2020    Discharge Date: 2020     Admitting Physician: Aden Wilson MD     Attending Physician:  Cameron Morocho DO     Admission Diagnoses: 40 weeks gestation of pregnancy [Z3A.40]  Amniotic fluid leaking [O42.90]  Uterine contractions during pregnancy [O62.2]    Discharge Diagnoses:   Information for the patient's :  Dominic Bardales [[id number]]   Delivery of a 3.63 kg female infant via Vaginal, Spontaneous on 2020 at 3:09 PM  by Maile Simpson. Apgars were 9  and 9 . Additional Diagnoses:   Hospital Problems  Date Reviewed: 2020          Codes Class Noted POA    Amniotic fluid leaking ICD-10-CM: O42.90  ICD-9-CM: 658.10  2020 Unknown        40 weeks gestation of pregnancy ICD-10-CM: Z3A.40  ICD-9-CM: V22.2  2020 Unknown        Uterine contractions during pregnancy ICD-10-CM: O62.2  ICD-9-CM: 661.20  2020 Unknown             Lab Results   Component Value Date/Time    Rubella, External immune 2019    GrBStrep, External neg 2020       Hospital Course: Normal hospital course following the delivery. Disposition at Discharge: Home or self care    Discharged Condition: Stable    Patient Instructions:   Current Discharge Medication List      START taking these medications    Details   ibuprofen (MOTRIN) 600 mg tablet Take 1 Tab by mouth every six (6) hours as needed for Pain. Qty: 40 Tab, Refills: 0         CONTINUE these medications which have NOT CHANGED    Details   buPROPion XL (WELLBUTRIN XL) 300 mg XL tablet Take 300 mg by mouth every morning. PNV No12-Iron-FA-DSS-OM-3 29 mg iron-1 mg -50 mg CPKD Take  by mouth.          STOP taking these medications       zolpidem (AMBIEN) 5 mg tablet Comments:   Reason for Stopping:               Reference my discharge instructions.     Follow-up Appointments   Procedures    FOLLOW UP VISIT Appointment in: 6 Weeks     Standing Status:   Standing     Number of Occurrences:   1     Order Specific Question:   Appointment in     Answer:   6 Weeks        Signed By:  Jose Luis David MD     July 26, 2020

## 2020-07-26 NOTE — DISCHARGE INSTRUCTIONS
Patient Education        Vaginal Childbirth: Care Instructions  Your Care Instructions     Your body will slowly heal in the next few weeks. It is easy to get too tired and overwhelmed during the first weeks after your baby is born. Changes in your hormones can shift your mood without warning. You may find it hard to meet the extra demands on your energy and time. Take it easy on yourself. Follow-up care is a key part of your treatment and safety. Be sure to make and go to all appointments, and call your doctor if you are having problems. It's also a good idea to know your test results and keep a list of the medicines you take. How can you care for yourself at home? · Vaginal bleeding and cramps  ? After delivery, you will have a bloody discharge from the vagina. This will turn pink within a week and then white or yellow after about 10 days. It may last for 2 to 4 weeks or longer, until the uterus has healed. Use pads instead of tampons until you stop bleeding. ? Do not worry if you pass some blood clots, as long as they are smaller than a golf ball. If you have a tear or stitches in your vaginal area, change the pad at least every 4 hours to prevent soreness and infection. ? You may have cramps for the first few days after childbirth. These are normal and occur as the uterus shrinks to normal size. Take an over-the-counter pain medicine, such as acetaminophen (Tylenol), ibuprofen (Advil, Motrin), or naproxen (Aleve), for cramps. Read and follow all instructions on the label. Do not take aspirin, because it can cause more bleeding. ? Do not take two or more pain medicines at the same time unless the doctor told you to. Many pain medicines have acetaminophen, which is Tylenol. Too much acetaminophen (Tylenol) can be harmful. · Stitches  ? If you have stitches, they will dissolve on their own and do not need to be removed. Follow your doctor's instructions for cleaning the stitched area.   ? Put ice or a cold pack on your painful area for 10 to 20 minutes at a time, several times a day, for the first few days. Put a thin cloth between the ice and your skin. ? Sit in a few inches of warm water (sitz bath) 3 times a day and after bowel movements. The warm water helps with pain and itching. If you do not have a tub, a warm shower might help. · Breast fullness  ? Your breasts may overfill (engorge) in the first few days after delivery. To help milk flow and to relieve pain, warm your breasts in the shower or by using warm, moist towels before nursing. ? If you are not nursing, do not put warmth on your breasts or touch your breasts. Wear a tight bra or sports bra and use ice until the fullness goes away. This usually takes 2 to 3 days. ? Put ice or a cold pack on your breast after nursing to reduce swelling and pain. Put a thin cloth between the ice and your skin. · Activity  ? Eat a balanced diet. Do not try to lose weight by cutting calories. Keep taking your prenatal vitamins, or take a multivitamin. ? Get as much rest as you can. Try to take naps when your baby sleeps during the day. ? Get some exercise every day. But do not do any heavy exercise until your doctor says it is okay. ? Wait until you are healed (about 4 to 6 weeks) before you have sexual intercourse. Your doctor will tell you when it is okay to have sex. ? Talk to your doctor about birth control. You can get pregnant even before your period returns. Also, you can get pregnant while you are breastfeeding. · Mental health  ? It is normal to have some sadness, anxiety, sleeplessness, and mood swings after you go home. If you feel upset or hopeless for more than a few days or are having trouble doing the things you need to do, talk to your doctor. · Constipation and hemorrhoids  ? Drink plenty of fluids, enough so that your urine is light yellow or clear like water.  If you have kidney, heart, or liver disease and have to limit fluids, talk with your doctor before you increase the amount of fluids you drink. ? Eat plenty of fiber each day. Have a bran muffin or bran cereal for breakfast, and try eating a piece of fruit for a mid-afternoon snack. ? For painful, itchy hemorrhoids, put ice or a cold pack on the area several times a day for 10 minutes at a time. Follow this by putting a warm compress on the area for another 10 to 20 minutes or by sitting in a shallow, warm bath. When should you call for help? Call  911 anytime you think you may need emergency care. For example, call if:  · You have thoughts of harming yourself, your baby, or another person. · You passed out (lost consciousness). · You have chest pain, are short of breath, or cough up blood. · You have a seizure. Call your doctor now or seek immediate medical care if:  · You have severe vaginal bleeding. · You are dizzy or lightheaded, or you feel like you may faint. · You have a fever. · You have new or more pain in your belly or pelvis. · You have symptoms of a blood clot in your leg (called a deep vein thrombosis), such as:  ? Pain in the calf, back of the knee, thigh, or groin. ? Redness and swelling in your leg or groin. · You have signs of preeclampsia, such as:  ? Sudden swelling of your face, hands, or feet. ? New vision problems (such as dimness, blurring, or seeing spots). ? A severe headache. Watch closely for changes in your health, and be sure to contact your doctor if:  · Your vaginal bleeding seems to be getting heavier. · You have new or worse vaginal discharge. · You feel sad, anxious, or hopeless for more than a few days. · You do not get better as expected. Where can you learn more? Go to http://www.gray.com/  Enter Q237 in the search box to learn more about \"Vaginal Childbirth: Care Instructions. \"  Current as of: February 11, 2020               Content Version: 12.5  © 0406-1848 Healthwise, Incorporated.    Care instructions adapted under license by [employer] (which disclaims liability or warranty for this information). If you have questions about a medical condition or this instruction, always ask your healthcare professional. [address] any warranty or liability for your use of this information. Patient Education        Learning About Coronavirus [phone])  Coronavirus [phone]): Overview  What is coronavirus (FVEQT-47)? The coronavirus disease (COVID-19) is caused by a virus. It is an illness that was first found in [place], [place], in December 2019. It has since spread worldwide. The virus can cause fever, cough, and trouble breathing. In severe cases, it can cause pneumonia and make it hard to breathe without help. It can cause death. Coronaviruses are a large group of viruses. They cause the common cold. They also cause more serious illnesses like Middle East respiratory syndrome (MERS) and severe acute respiratory syndrome (SARS). COVID-19 is caused by a novel coronavirus. That means it's a new type that has not been seen in people before. This virus spreads person-to-person through droplets from coughing and sneezing. It can also spread when you are close to someone who is infected. And it can spread when you touch something that has the virus on it, such as a doorknob or a tabletop. What can you do to protect yourself from coronavirus (COVID-19)? The best way to protect yourself from getting sick is to:  · Avoid areas where there is an outbreak. · Avoid contact with people who may be infected. · Wash your hands often with soap or alcohol-based hand sanitizers. · Avoid crowds and try to stay at least 6 feet away from other people. · Wash your hands often, especially after you cough or sneeze. Use soap and water, and scrub for at least 20 seconds. If soap and water aren't available, use an alcohol-based hand . · Avoid touching your mouth, nose, and eyes.   What can you do to avoid spreading the virus to others? To help avoid spreading the virus to others:  · Cover your mouth with a tissue when you cough or sneeze. Then throw the tissue in the trash. · Use a disinfectant to clean things that you touch often. · Wear a cloth face cover if you have to go to public areas. · Stay home if you are sick or have been exposed to the virus. Don't go to school, work, or public areas. And don't use public transportation, ride-shares, or taxis unless you have no choice. · If you are sick:  ? Leave your home only if you need to get medical care. But call the doctor's office first so they know you're coming. And wear a face cover. ? Wear the face cover whenever you're around other people. It can help stop the spread of the virus when you cough or sneeze. ? Clean and disinfect your home every day. Use household  and disinfectant wipes or sprays. Take special care to clean things that you grab with your hands. These include doorknobs, remote controls, phones, and handles on your refrigerator and microwave. And don't forget countertops, tabletops, bathrooms, and computer keyboards. When to call for help  [id number] anytime you think you may need emergency care. For example, call if:  · You have severe trouble breathing. (You can't talk at all.)  · You have constant chest pain or pressure. · You are severely dizzy or lightheaded. · You are confused or can't think clearly. · Your face and lips have a blue color. · You pass out (lose consciousness) or are very hard to wake up. Call your doctor now if you develop symptoms such as:  · Shortness of breath. · Fever. · Cough. If you need to get care, call ahead to the doctor's office for instructions before you go. Make sure you wear a face cover to prevent exposing other people to the virus. Where can you get the latest information? The following health organizations are tracking and studying this virus.  Their websites contain the most up-to-date information. Mayranahum Malia also learn what to do if you think you may have been exposed to the virus. · U.S. Centers for Disease Control and Prevention (CDC): The CDC provides updated news about the disease and travel advice. The website also tells you how to prevent the spread of infection. www.cdc.gov  · World Health Organization [facility]): WHO offers information about the virus outbreaks. WHO also has travel advice. www.who.int  Current as of: May 8, 2020               Content Version: 12.5  © 2006-2020 Healthwise, Incorporated. Care instructions adapted under license by [employer] (which disclaims liability or warranty for this information). If you have questions about a medical condition or this instruction, always ask your healthcare professional. [address] any warranty or liability for your use of this information.

## 2020-07-26 NOTE — PROGRESS NOTES
0730 Received report from [address] [place] [address] using sbar format  1954  Discharge instructions with prescription for motrin given to patient and discussed  No further questions per patient  Patient to make appointment with her Ob in six weeks and to call if she has any questions or concerns  Patient discharged home with infant

## 2020-07-26 NOTE — PROGRESS NOTES
Post-Partum Day Number 2 Progress Note    Frankie Chung     Assessment: Doing well, post partum day 2    Plan:   1. Discharge home today  2. Follow up in office in 6 weeks with Cameron Morocho DO  3. Post partum activity advised, diet as tolerated  4. Anxiety/ Depression: stable on wellbutrin    Information for the patient's :  Dominic Purchase [[id number]]   Vaginal, Spontaneous      Subjective:  Patient doing well without significant complaint. Voiding without difficulty, normal lochia. Vitals:  Visit Vitals  /72 (BP 1 Location: Right arm)   Pulse 84   Temp 98.2 °F (36.8 °C)   Resp 16   Ht 5' 5\" (1.651 m)   Wt 78 kg (172 lb)   SpO2 98%   Breastfeeding Unknown   BMI 28.62 kg/m²     Temp (24hrs), Av.1 °F (36.7 °C), Min:97.8 °F (36.6 °C), Max:98.4 °F (36.9 °C)      Exam:         Patient without distress. Abdomen soft, fundus firm, nontender                 Lower extremities are negative for swelling, cords or tenderness. Labs:     Lab Results   Component Value Date/Time    WBC 9.8 2020 03:05 AM    HGB 12.9 2020 03:05 AM    HCT 38.9 2020 03:05 AM    PLATELET 662  03:05 AM       No results found for this or any previous visit (from the past 24 hour(s)).

## 2024-07-09 NOTE — PROGRESS NOTES
8277 Bedside and Verbal shift change report given to JONATHON Barraza RN (oncoming nurse) by Paige Ortiz RN (offgoing nurse). Report included the following information SBAR, Intake/Output, MAR and Recent Results. 36 Dr Farheen Wooten at bedside and reviewed fhr strip. MD assessed patients pushing and recommends starting pitocin and patient to labor down    2868 Bedside and Verbal shift change report given to BEATRIZ Jason RN (oncoming nurse) by Adam Padilla RN (offgoing nurse). Report included the following information SBAR, Intake/Output, MAR and Recent Results. D/c instructions given to and reviewed with patient.   Verbalized understanding of d/c instructions.   Denies questions.   Patient off unit in wheelchair being pushed by mother.